# Patient Record
Sex: MALE | Race: WHITE | URBAN - METROPOLITAN AREA
[De-identification: names, ages, dates, MRNs, and addresses within clinical notes are randomized per-mention and may not be internally consistent; named-entity substitution may affect disease eponyms.]

---

## 2017-02-20 DIAGNOSIS — Z98.1 H/O SPINAL FUSION: ICD-10-CM

## 2017-02-20 DIAGNOSIS — M42.00 SCHEUERMANN'S KYPHOSIS: Primary | ICD-10-CM

## 2017-03-02 ENCOUNTER — OFFICE VISIT (OUTPATIENT)
Dept: ORTHOPEDICS | Facility: CLINIC | Age: 32
End: 2017-03-02

## 2017-03-02 VITALS — WEIGHT: 289.3 LBS | BODY MASS INDEX: 35.97 KG/M2 | HEIGHT: 75 IN

## 2017-03-02 DIAGNOSIS — Z98.1 H/O SPINAL FUSION: ICD-10-CM

## 2017-03-02 DIAGNOSIS — M42.00 SCHEUERMANN'S KYPHOSIS: Primary | ICD-10-CM

## 2017-03-02 RX ORDER — TIZANIDINE HYDROCHLORIDE 4 MG/1
1 CAPSULE, GELATIN COATED ORAL 2 TIMES DAILY
COMMUNITY
Start: 2016-11-15 | End: 2017-07-27

## 2017-03-02 RX ORDER — DEXTROAMPHETAMINE SACCHARATE, AMPHETAMINE ASPARTATE, DEXTROAMPHETAMINE SULFATE AND AMPHETAMINE SULFATE 2.5; 2.5; 2.5; 2.5 MG/1; MG/1; MG/1; MG/1
1 TABLET ORAL 3 TIMES DAILY
COMMUNITY
Start: 2016-11-15 | End: 2017-07-27

## 2017-03-02 RX ORDER — SERTRALINE HYDROCHLORIDE 100 MG/1
1 TABLET, FILM COATED ORAL DAILY
COMMUNITY
Start: 2015-04-15 | End: 2017-07-27

## 2017-03-02 RX ORDER — ALPRAZOLAM 1 MG
1 TABLET ORAL EVERY 8 HOURS
COMMUNITY
Start: 2016-10-15 | End: 2017-07-27

## 2017-03-02 RX ORDER — ALBUTEROL SULFATE 0.83 MG/ML
SOLUTION RESPIRATORY (INHALATION) PRN
COMMUNITY
Start: 2005-04-05

## 2017-03-02 RX ORDER — OXYCODONE HYDROCHLORIDE 15 MG/1
TABLET ORAL EVERY 6 HOURS PRN
COMMUNITY
Start: 2016-03-04 | End: 2017-07-27

## 2017-03-02 ASSESSMENT — ENCOUNTER SYMPTOMS
ARTHRALGIAS: 0
MUSCLE WEAKNESS: 0
BACK PAIN: 1
STIFFNESS: 0
NECK PAIN: 1
MYALGIAS: 1
MUSCLE CRAMPS: 0

## 2017-03-02 NOTE — MR AVS SNAPSHOT
"              After Visit Summary   3/2/2017    Octavio Eisenberg    MRN: [id number]           Patient Information     Date Of Birth          1985        Visit Information        Provider Department      3/2/2017 1:30 PM José Miguel Anne MD [facility]        Today's Diagnoses     Scheuermann's kyphosis    -  1    H/O spinal fusion           Follow-ups after your visit        Who to contact     Please call your clinic at [phone] to:    Ask questions about your health    Make or cancel appointments    Discuss your medicines    Learn about your test results    Speak to your doctor   If you have compliments or concerns about an experience at your clinic, or if you wish to file a complaint, please contact [facility] Patient Relations at [phone] or email us at [email or web address]         Additional Information About Your Visit        MyChart Information     eshtery is an electronic gateway that provides easy, online access to your medical records. With eshtery, you can request a clinic appointment, read your test results, renew a prescription or communicate with your care team.     To sign up for [employer] visit the website at [email or web address]   You will be asked to enter the access code listed below, as well as some personal information. Please follow the directions to create your username and password.     Your access code is: [email or web address]  Expires: 2017  7:31 AM     Your access code will  in 90 days. If you need help or a new code, please contact your [facility] or call [phone] for assistance.        Care EveryWhere ID     This is your Care EveryWhere ID. This could be used by other organizations to access your [place] medical records  [id number]        Your Vitals Were     Height BMI (Body Mass Index)                1.9 m (6' 2.8\") 36.35 kg/m2           Blood Pressure from Last 3 " Encounters:   03/14/15 105/55    Weight from Last 3 Encounters:   03/02/17 131.2 kg (289 lb 4.8 oz)   06/03/15 132.4 kg (291 lb 12.8 oz)   04/02/15 131.1 kg (289 lb)              Today, you had the following     No orders found for display         Today's Medication Changes          These changes are accurate as of: 3/2/17 11:59 PM.  If you have any questions, ask your nurse or doctor.               These medicines have changed or have updated prescriptions.        Dose/Directions    oxyCODONE 15 MG IR tablet   Commonly known as:  ROXICODONE   This may have changed:  Another medication with the same name was removed. Continue taking this medication, and follow the directions you see here.   Changed by:  José Miguel Anne MD        every 6 hours as needed   Refills:  0       sertraline 100 MG tablet   Commonly known as:  ZOLOFT   This may have changed:  Another medication with the same name was removed. Continue taking this medication, and follow the directions you see here.   Changed by:  José Miguel Anne MD        Dose:  1 tablet   1 tablet daily   Refills:  0         Stop taking these medicines if you haven't already. Please contact your care team if you have questions.     acetaminophen 500 MG tablet   Commonly known as:  TYLENOL   Stopped by:  José Miguel Anne MD           CEPHALEXIN PO   Stopped by:  José Miguel Anne MD           FLEXERIL PO   Stopped by:  José Miguel Anne MD           gabapentin 600 MG tablet   Commonly known as:  NEURONTIN   Stopped by:  José Miguel Anne MD           morphine 30 MG 12 hr tablet   Commonly known as:  MS CONTIN   Stopped by:  José Miguel Anne MD           pantoprazole 40 MG EC tablet   Commonly known as:  PROTONIX   Stopped by:  José Miguel Anne MD           polyethylene glycol Packet   Commonly known as:  MIRALAX/GLYCOLAX   Stopped by:  José Miguel Anne MD           VALIUM PO   Stopped by:  José Miguel Anne MD                    Primary Care  Provider    Physician No Ref-Primary       No address on file        Thank you!     Thank you for choosing [facility]  for your care. Our goal is always to provide you with excellent care. Hearing back from our patients is one way we can continue to improve our services. Please take a few minutes to complete the written survey that you may receive in the mail after your visit with us. Thank you!             Your Updated Medication List - Protect others around you: Learn how to safely use, store and throw away your medicines at www.disposemymeds.org.          This list is accurate as of: 3/2/17 11:59 PM.  Always use your most recent med list.                   Brand Name Dispense Instructions for use    ADDERALL 10 MG per tablet   Generic drug:  amphetamine-dextroamphetamine      1 tablet 3 times daily       ADVAIR DISKUS 250-50 MCG/DOSE diskus inhaler   Generic drug:  fluticasone-salmeterol      1 puff daily       albuterol (2.5 MG/3ML) 0.083% neb solution      as needed       ALPRAZolam 1 MG tablet    XANAX     1 tablet every 8 hours       oxyCODONE 15 MG IR tablet    ROXICODONE     every 6 hours as needed       POTASSIUM PO      Take 99 mEq by mouth daily       senna-docusate 8.6-50 MG per tablet    SENOKOT-S;PERICOLACE    30 tablet    Take 2 tablets by mouth 2 times daily       sertraline 100 MG tablet    ZOLOFT     1 tablet daily       TESTOSTERONE          tiZANidine 4 MG capsule    ZANAFLEX     1 capsule 2 times daily       ZANTAC PO      Take 150 mg by mouth 2 times daily

## 2017-03-02 NOTE — LETTER
3/2/2017       RE: Octavio Eisenberg  [address] [place] [place] [address]     Dear Colleague,    Thank you for referring your patient, Octavio Eisenberg, to the [facility] at [facility]. Please see a copy of my visit note below.    REASON FOR VISIT:  Follow revision posterior instrumented spinal fusion, due to broken instrumentation on 03/10/2015 by Dr. Anne.  The patient states that overall he is doing better than he was before.  He has been active recently in attempting to lose weight.  He states that he has lost 31 pounds since last year.  He continues on oxycodone 15 mg approximately every 6 hours or so.  His SYDNI score today is 46%, VAS back score is 7.5, PROMIS-10 global physical health score 11, mental health score 10, total score 21.      PHYSICAL EXAMINATION:  On exam, Octavio is a pleasant, large adult male with a height of 6 feet 3 inches, weight 289 pounds, calculated BMI of 36.35.  He stands and ambulates about the room freely.  He does have mild thoracic kyphosis persistent.  His pupils are constricted, and his generalized affect is quite narcotized.      IMAGING:  Repeat full standing AP and lateral spine radiographs were reviewed today.  His instrumentation is stable.  There is no evidence of hardware loosening or failure.  His pelvic incidence is 49 degrees, lumbar lordosis 35 degrees, and thoracic kyphosis approximately 49 degrees.  He has PILL mismatch of 14 degrees.      IMPRESSION:     1.  Scheuermann's kyphosis, status post revision posterior spinal fusion due to broken instrumentation.   2.  PILL mismatch with lumbar lordosis of 35 degrees and pelvic incidence of 49 degrees.      PLAN:  We had a nice discussion with Octavio today.  We congratulated him on his success with weight loss.  At this time, we discussed options moving forward.  We generally do not recommend the use of narcotics for chronic pain; however, the patient feels quite adamant  that he needs narcotics in order to continue to lose weight.  He feels that he needs for narcotics to be able to participate in exercise such as cycling and lifting.        With regard to his ongoing pain complaints and PILL mismatch, we did discuss the potential for surgical opportunities here including thoracolumbar combination Ruth-Su osteotomies and TLIF's versus a pedicle subtraction osteotomy at or near the thoracolumbar junction.  This could potentially correct his PILL mismatch and improve his overall posture and pain.  We discussed the risks, benefits and alternatives to this at length including the risk of blood loss, infection, damage to neurological structures, hardware failure, or failure to fuse.  The patient expressed good understanding.  The patient stated that he would like to get into better shape and lose more weight prior to considering surgery.  He will contact us when he is ready to discuss more.  We encouraged him to bring his mother with him on his next visit with repeat radiographs obtained prior to further discuss next steps.  He will ultimately decide on how he wishes to proceed.  We explained that there is no danger of continuing his current course at this time.  All questions and concerns were answered and addressed.      Total time spent with the patient was approximately 45 minutes with greater than 50% in counseling and coordination of care.      This patient was seen with Dr. Anne, who agrees with the above.       I saw and evaluated the patient on the day of the visit and I formulated the plan.  José Miguel Anne MD     cc: Octavio Eisenberg

## 2017-03-02 NOTE — PROGRESS NOTES
REASON FOR VISIT:  Follow revision posterior instrumented spinal fusion, due to broken instrumentation on 03/10/2015 by Dr. Anne.  The patient states that overall he is doing better than he was before.  He has been active recently in attempting to lose weight.  He states that he has lost 31 pounds since last year.  He continues on oxycodone 15 mg approximately every 6 hours or so.  His SYDNI score today is 46%, VAS back score is 7.5, PROMIS-10 global physical health score 11, mental health score 10, total score 21.      PHYSICAL EXAMINATION:  On exam, Octavio is a pleasant, large adult male with a height of 6 feet 3 inches, weight 289 pounds, calculated BMI of 36.35.  He stands and ambulates about the room freely.  He does have mild thoracic kyphosis persistent.  His pupils are constricted, and his generalized affect is quite narcotized.      IMAGING:  Repeat full standing AP and lateral spine radiographs were reviewed today.  His instrumentation is stable.  There is no evidence of hardware loosening or failure.  His pelvic incidence is 49 degrees, lumbar lordosis 35 degrees, and thoracic kyphosis approximately 49 degrees.  He has PILL mismatch of 14 degrees.      IMPRESSION:     1.  Scheuermann's kyphosis, status post revision posterior spinal fusion due to broken instrumentation.   2.  PILL mismatch with lumbar lordosis of 35 degrees and pelvic incidence of 49 degrees.      PLAN:  We had a nice discussion with Octavio today.  We congratulated him on his success with weight loss.  At this time, we discussed options moving forward.  We generally do not recommend the use of narcotics for chronic pain; however, the patient feels quite adamant that he needs narcotics in order to continue to lose weight.  He feels that he needs for narcotics to be able to participate in exercise such as cycling and lifting.        With regard to his ongoing pain complaints and PILL mismatch, we did discuss the potential for surgical  opportunities here including thoracolumbar combination Ruth-Su osteotomies and TLIF's versus a pedicle subtraction osteotomy at or near the thoracolumbar junction.  This could potentially correct his PILL mismatch and improve his overall posture and pain.  We discussed the risks, benefits and alternatives to this at length including the risk of blood loss, infection, damage to neurological structures, hardware failure, or failure to fuse.  The patient expressed good understanding.  The patient stated that he would like to get into better shape and lose more weight prior to considering surgery.  He will contact us when he is ready to discuss more.  We encouraged him to bring his mother with him on his next visit with repeat radiographs obtained prior to further discuss next steps.  He will ultimately decide on how he wishes to proceed.  We explained that there is no danger of continuing his current course at this time.  All questions and concerns were answered and addressed.      Total time spent with the patient was approximately 45 minutes with greater than 50% in counseling and coordination of care.      This patient was seen with Dr. Anne, who agrees with the above.       I saw and evaluated the patient on the day of the visit and I formulated the plan.  José Miguel Anne MD     cc: Octavio Eisenberg         Answers for HPI/ROS submitted by the patient on 3/2/2017   General Symptoms: No  Skin Symptoms: No  HENT Symptoms: No  EYE SYMPTOMS: No  HEART SYMPTOMS: No  LUNG SYMPTOMS: No  INTESTINAL SYMPTOMS: No  URINARY SYMPTOMS: No  REPRODUCTIVE SYMPTOMS: No  SKELETAL SYMPTOMS: Yes  BLOOD SYMPTOMS: No  NERVOUS SYSTEM SYMPTOMS: No  MENTAL HEALTH SYMPTOMS: No  Back pain: Yes  Muscle aches: Yes  Neck pain: Yes  Joint pain: No  Bone pain: Yes  Muscle cramps: No  Muscle weakness: No  Joint stiffness: No  Bone fracture: No

## 2017-03-02 NOTE — LETTER
3/2/2017       RE: Octavio Eisenberg  [address] [facility] [place] [address]     Dear Colleague,    Thank you for referring your patient, Octavio Eisenberg, to the [facility] at [facility]. Please see a copy of my visit note below.    REASON FOR VISIT:  Follow revision posterior instrumented spinal fusion, due to broken instrumentation on 03/10/2015 by Dr. Anne.  The patient states that overall he is doing better than he was before.  He has been active recently in attempting to lose weight.  He states that he has lost 31 pounds since last year.  He continues on oxycodone 15 mg approximately every 6 hours or so.  His SYDNI score today is 46%, VAS back score is 7.5, PROMIS-10 global physical health score 11, mental health score 10, total score 21.      PHYSICAL EXAMINATION:  On exam, Octavio is a pleasant, large adult male with a height of 6 feet 3 inches, weight 289 pounds, calculated BMI of 36.35.  He stands and ambulates about the room freely.  He does have mild thoracic kyphosis persistent.  His pupils are constricted, and his generalized affect is quite narcotized.      IMAGING:  Repeat full standing AP and lateral spine radiographs were reviewed today.  His instrumentation is stable.  There is no evidence of hardware loosening or failure.  His pelvic incidence is 49 degrees, lumbar lordosis 35 degrees, and thoracic kyphosis approximately 49 degrees.  He has PILL mismatch of 14 degrees.      IMPRESSION:     1.  Scheuermann's kyphosis, status post revision posterior spinal fusion due to broken instrumentation.   2.  PILL mismatch with lumbar lordosis of 35 degrees and pelvic incidence of 49 degrees.      PLAN:  We had a nice discussion with Octavio today.  We congratulated him on his success with weight loss.  At this time, we discussed options moving forward.  We generally do not recommend the use of narcotics for chronic pain; however, the patient feels quite adamant  that he needs narcotics in order to continue to lose weight.  He feels that he needs for narcotics to be able to participate in exercise such as cycling and lifting.        With regard to his ongoing pain complaints and PILL mismatch, we did discuss the potential for surgical opportunities here including thoracolumbar combination Ruth-Su osteotomies and TLIF's versus a pedicle subtraction osteotomy at or near the thoracolumbar junction.  This could potentially correct his PILL mismatch and improve his overall posture and pain.  We discussed the risks, benefits and alternatives to this at length including the risk of blood loss, infection, damage to neurological structures, hardware failure, or failure to fuse.  The patient expressed good understanding.  The patient stated that he would like to get into better shape and lose more weight prior to considering surgery.  He will contact us when he is ready to discuss more.  We encouraged him to bring his mother with him on his next visit with repeat radiographs obtained prior to further discuss next steps.  He will ultimately decide on how he wishes to proceed.  We explained that there is no danger of continuing his current course at this time.  All questions and concerns were answered and addressed.      Total time spent with the patient was approximately 45 minutes with greater than 50% in counseling and coordination of care.      This patient was seen with Dr. Anne, who agrees with the above.       I saw and evaluated the patient on the day of the visit and I formulated the plan.  José Miguel Anne MD     cc: Octavio Eisenberg

## 2017-07-24 DIAGNOSIS — M42.00 SCHEUERMANN'S KYPHOSIS: Primary | ICD-10-CM

## 2017-07-24 DIAGNOSIS — Z98.1 H/O SPINAL FUSION: ICD-10-CM

## 2017-07-27 ENCOUNTER — OFFICE VISIT (OUTPATIENT)
Dept: ORTHOPEDICS | Facility: CLINIC | Age: 32
End: 2017-07-27

## 2017-07-27 VITALS — BODY MASS INDEX: 31.04 KG/M2 | HEIGHT: 75 IN | WEIGHT: 249.6 LBS

## 2017-07-27 DIAGNOSIS — M42.00 SCHEUERMANN'S KYPHOSIS: Primary | ICD-10-CM

## 2017-07-27 DIAGNOSIS — Z98.1 H/O SPINAL FUSION: ICD-10-CM

## 2017-07-27 RX ORDER — HYDROCODONE BITARTRATE AND ACETAMINOPHEN 10; 325 MG/1; MG/1
1-2 TABLET ORAL EVERY 6 HOURS PRN
COMMUNITY

## 2017-07-27 ASSESSMENT — ENCOUNTER SYMPTOMS
NERVOUS/ANXIOUS: 1
PANIC: 0
STIFFNESS: 0
ARTHRALGIAS: 0
MYALGIAS: 1
MUSCLE CRAMPS: 0
DEPRESSION: 1
BACK PAIN: 1
JOINT SWELLING: 0
NECK PAIN: 1
MUSCLE WEAKNESS: 0
INSOMNIA: 1
DECREASED CONCENTRATION: 0

## 2017-07-27 NOTE — LETTER
7/27/2017       RE: Octavio Eisenberg  [address] [facility] [address]     Dear Colleague,    Thank you for referring your patient, Octavio Eisenberg, to the [facility] at [facility]. Please see a copy of my visit note below.    REASON FOR VISIT: RECHECK    REFERRING PHYSICIAN: Established Patient     PRIMARY CARE PHYSICIAN: No Ref-Primary, Physician    HISTORY OF PRESENT ILLNESS: Octavio Eisenberg is a 31 year old male who returns to clinic today for assessment of increased back pain that occurred after golfing five weeks ago.  He is status post revision posterior spinal fusion, which was performed by Dr. Anne on 3/10/15.  Five weeks ago after golfing, he noted a sudden and intense increase in his mid-thoracic back pain.  This kept him in bed for about five days, and he had to use vicodin to treat the pain.  He was very concerned about his hardware and possible fracture.  However, since the injury, his pain has improved greatly.  He was last seen on 3/2/17 when he discussed surgical options with Dr. Anne.  He states that he would not consider surgery at this time because he is feeling good, but he brought his mother with him today to discuss and understand the proposed procedure.  He notes that he has not taken Vicodin or any other narcotics for the past two weeks.     Oswestry score: 30% (previously 46%)  Ohfjub20: 20 (previously 21)  Pain scale: 4 (previously 7)    PHYSICAL EXAM:   Constitutional - Patient is healthy, well-nourished and appears stated age.    BMI = 31.36    Respiratory - Patient is breathing normally and in no respiratory distress.   Skin - No suspicious rashes or lesions.   Psychiatric - Normal mood and affect.   Eyes - Visual acuity is normal to the written word.   ENT - Hearing intact to the spoken word.   GI - No abdominal distention.   Musculoskeletal - Non-antalgic gait without use of assistive devices.       IMAGING: Radiographs of  the full spine were obtained today.  They show instrumentation that is intact without evidence of fracture, loosening or migration.  PI=46 degrees.  LL=35 degrees. See full radiologic report in chart.    CLINICAL ASSESSMENT:   1. Scheuermann's kyphosis, status post revision posterior spinal fusion due to broken instrumentation  2. Slight PI-LL mismatch    DISCUSSION/PLAN:   Octavio is a 31 year old male who returns to clinic today for assessment of his posterior spinal fusion instrumentation.  He injured his back five weeks ago while playing golf and experienced intense pain that made him concerned about his hardware.  Radiographs were obtained today, and they showed that the instrumentation is intact without evidence of loosening or failure.  He felt very reassured by this.  We briefly discussed surgery with Octavio and his mother that could increase his lumbar lordosis in future.  However, this is not something that needs to be performed unless he cannot live with his symptoms.  The best thing Octavio can do at this time is to continue low impact aerobic exercise to maintain disc health.  He can follow-up as needed.    All questions and concerns were answered to the patient's apparent satisfaction before leaving the clinic.     Thank you for allowing Dr. Anne and I to participate in the care of Octavio Eisenberg.    Respectfully,  Gayathri Simpson PA-C    I saw and evaluated the patient on the day of the visit and I formulated the plan.  José Miguel Anne MD    CC  Copy to patient  [address] [facility] [address]

## 2017-07-27 NOTE — PROGRESS NOTES
REASON FOR VISIT: RECHECK    REFERRING PHYSICIAN: Established Patient     PRIMARY CARE PHYSICIAN: No Ref-Primary, Physician    HISTORY OF PRESENT ILLNESS: Octavio Eisenberg is a 31 year old male who returns to clinic today for assessment of increased back pain that occurred after golfing five weeks ago.  He is status post revision posterior spinal fusion, which was performed by Dr. Anne on 3/10/15.  Five weeks ago after golfing, he noted a sudden and intense increase in his mid-thoracic back pain.  This kept him in bed for about five days, and he had to use vicodin to treat the pain.  He was very concerned about his hardware and possible fracture.  However, since the injury, his pain has improved greatly.  He was last seen on 3/2/17 when he discussed surgical options with Dr. Anne.  He states that he would not consider surgery at this time because he is feeling good, but he brought his mother with him today to discuss and understand the proposed procedure.  He notes that he has not taken Vicodin or any other narcotics for the past two weeks.     Oswestry score: 30% (previously 46%)  Khwzdo07: 20 (previously 21)  Pain scale: 4 (previously 7)    PHYSICAL EXAM:   Constitutional - Patient is healthy, well-nourished and appears stated age.    BMI = 31.36    Respiratory - Patient is breathing normally and in no respiratory distress.   Skin - No suspicious rashes or lesions.   Psychiatric - Normal mood and affect.   Eyes - Visual acuity is normal to the written word.   ENT - Hearing intact to the spoken word.   GI - No abdominal distention.   Musculoskeletal - Non-antalgic gait without use of assistive devices.       IMAGING: Radiographs of the full spine were obtained today.  They show instrumentation that is intact without evidence of fracture, loosening or migration.  PI=46 degrees.  LL=35 degrees. See full radiologic report in chart.    CLINICAL ASSESSMENT:   1. Scheuermann's kyphosis, status post revision posterior  spinal fusion due to broken instrumentation  2. Slight PI-LL mismatch    DISCUSSION/PLAN:   Octavio is a 31 year old male who returns to clinic today for assessment of his posterior spinal fusion instrumentation.  He injured his back five weeks ago while playing golf and experienced intense pain that made him concerned about his hardware.  Radiographs were obtained today, and they showed that the instrumentation is intact without evidence of loosening or failure.  He felt very reassured by this.  We briefly discussed surgery with Octavio and his mother that could increase his lumbar lordosis in future.  However, this is not something that needs to be performed unless he cannot live with his symptoms.  The best thing Octavio can do at this time is to continue low impact aerobic exercise to maintain disc health.  He can follow-up as needed.    All questions and concerns were answered to the patient's apparent satisfaction before leaving the clinic.     Thank you for allowing Dr. Anne and I to participate in the care of Octavio Eisenberg.    Respectfully,  Gayathri Simpson PA-C    I saw and evaluated the patient on the day of the visit and I formulated the plan.  José Miguel Anne MD      CC  Copy to patient    [address] [facility] [id number]    Answers for HPI/ROS submitted by the patient on 7/27/2017   General Symptoms: No  Skin Symptoms: No  HENT Symptoms: No  EYE SYMPTOMS: No  HEART SYMPTOMS: No  LUNG SYMPTOMS: No  INTESTINAL SYMPTOMS: No  URINARY SYMPTOMS: No  REPRODUCTIVE SYMPTOMS: No  SKELETAL SYMPTOMS: Yes  BLOOD SYMPTOMS: No  NERVOUS SYSTEM SYMPTOMS: No  MENTAL HEALTH SYMPTOMS: Yes  Back pain: Yes  Muscle aches: Yes  Neck pain: Yes  Swollen joints: No  Joint pain: No  Bone pain: Yes  Muscle cramps: No  Muscle weakness: No  Joint stiffness: No  Bone fracture: No  Nervous or Anxious: Yes  Depression: Yes  Trouble sleeping: Yes  Trouble thinking or concentrating: No  Mood changes: Yes  Panic attacks: No

## 2017-07-27 NOTE — NURSING NOTE
"Reason For Visit:   Chief Complaint   Patient presents with     RECHECK     s/p PSF 3/10/15              Pt. states his back pain increased about 5 weeks ago after golfing, this is steadily improving.    Primary MD: none  Ref. 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  Currently working? Yes.  Work status?  Full time.  Date of injury: none    Date of surgery: 3/10/15  Type of surgery: PROCEDURES:   1.  Revision of posterior spinal fusion.   2.  Reinsertion segmental spinal instrumentation T12 through L3.   3.  Image-guided surgery for placement L2 pedicle screws.   4.  T12-L1 Smith-Su osteotomy.   5.  Injection of intrathecal substance. .  Smoker: No      Ht 1.9 m (6' 2.8\")  Wt 113.2 kg (249 lb 9.6 oz)  BMI 31.36 kg/m2    Pain Assessment  Patient Currently in Pain: Yes  0-10 Pain Scale: 4  Primary Pain Location: Back  Pain Orientation: Mid  Pain Descriptors: Aching  Alleviating Factors: Other (comment) (nothing)  Aggravating Factors: Other (comment) (overuse / golfing about 5 weeks ago)    Oswestry (SYDNI) Questionnaire    OSWESTRY DISABILITY INDEX 7/27/2017   Section 1 - Pain intensity 2   Section 2 - Personal care (washing, dressing, etc.)  1   Section 3 - Lifting 2   Section 4 - Walking 1   Section 5 - Sitting 1   Section 6 - Standing 2   Section 7 - Sleeping 1   Section 8 - Sex life (if applicable) 1   Section 9 - Social life 3   Section 10 - Traveling 1   Count 10   Sum 15   Oswestry Score (%) 30   SECTION 1-PAIN INTENSITY The pain is moderate at the moment.   SECTION 2-PERSONAL CARE (WASHING,DRESSING,ETC.) I can look after myself normally but it is very painful.   SECTION 3-LIFTING Pain prevents me from lifting heavy weights off the floor but I can manage if they are conveniently positioned, e.g. on a table.   SECTION 4-WALKING Pain prevents me from walking more than one mile.   SECTION 5-SITTING I can sit in my favorite chair as long as I like.   SECTION 6-STANDING Pain prevents me from " standing for more than 1 hour.   SECTION 7-SLEEPING My sleep is occasionally interrupted by pain.   SECTION 8-SEX LIFE (IF APPLICABLE) My sex life is normal but causes some additional pain.   SECTION 9-SOCIAL LIFE Pain has restricted my social life and I do not go out as often.   SECTION 10-TRAVELING I can travel anywhere but it gives me additional pain.   Oswestry Disability Index: Count 10   SYDNI: Total Score = SUM (total for answered questions) 15   Computed Oswestry Score 30 (%)   Some recent data might be hidden                  Numeric Rating Scale:  VAS Scores     VAS Survey 7/27/2017   What is your level of back pain during the last week: 5.5   What is your level of RIGHT leg pain during the last week: 0   What is your level of LEFT leg pain during the last week: 0   What is your level of neck pain during the last week: 0   What is your level of RIGHT arm pain during the last week: 0   What is your level of LEFT arm pain during the last week: 0                Promis 10 Assessment    PROMIS 10 7/27/2017   In general, would you say your health is: Fair   In general, would you say your quality of life is: Good   In general, how would you rate your physical health? Fair   In general, how would you rate your mental health, including your mood and your ability to think? Good   In general, how would you rate your satisfaction with your social activities and relationships? Fair   In general, please rate how well you carry out your usual social activities and roles Fair   To what extent are you able to carry out your everyday physical activities such as walking, climbing stairs, carrying groceries, or moving a chair? Moderately   How often have you been bothered by emotional problems such as feeling anxious, depressed or irritable? Often   How would you rate your fatigue on average? Severe   How would you rate your pain on average?   0 = No Pain  to  10 = Worst Imaginable Pain 5   Global Physical Health Score : Raw  Score 10 (SUM : G03 - G06 - G07 - G08)   Global Mentall Health Score : Raw Score 10 (SUM : G02 - G04 - G05 - G10)   Total (Physical + Mental Health Score) 20   In general, would you say your health is: 2   In general, would you say your quality of life is: 3   In general, how would you rate your physical health? 2   In general, how would you rate your mental health, including your mood and your ability to think? 3   In general, how would you rate your satisfaction with your social activities and relationships? 2   In general, please rate how well you carry out your usual social activities and roles. (This includes activities at home, at work and in your community, and responsibilities as a parent, child, spouse, employee, friend, etc.) 2   To what extent are you able to carry out your everyday physical activities such as walking, climbing stairs, carrying groceries, or moving a chair? 3   In the past 7 days, how often have you been bothered by emotional problems such as feeling anxious, depressed, or irritable? 2   In the past 7 days, how would you rate your fatigue on average? 2   In the past 7 days, how would you rate your pain on average, where 0 means no pain, and 10 means worst imaginable pain? 5   Global Mental Health Score 10   Global Physical Health Score 10   PROMIS TOTAL - SUBSCORES 20   Pain question re-calculation - no clinical value 3   Some recent data might be hidden

## 2017-07-27 NOTE — MR AVS SNAPSHOT
After Visit Summary   2017    Octavio Eisenberg    MRN: [id number]           Patient Information     Date Of Birth          1985        Visit Information        Provider Department      2017 11:30 AM José Miguel Anne MD [facility]        Today's Diagnoses     Scheuermann's kyphosis    -  1    H/O spinal fusion           Follow-ups after your visit        Follow-up notes from your care team     Return in about 1 year (around 2018).      Who to contact     Please call your clinic at [phone] to:    Ask questions about your health    Make or cancel appointments    Discuss your medicines    Learn about your test results    Speak to your doctor   If you have compliments or concerns about an experience at your clinic, or if you wish to file a complaint, please contact [facility] Patient Relations at [phone] or email us at [email or web address]         Additional Information About Your Visit        MyChart Information     [employer] is an electronic gateway that provides easy, online access to your medical records. With [employer], you can request a clinic appointment, read your test results, renew a prescription or communicate with your care team.     To sign up for [employer] visit the website at [email or web address]   You will be asked to enter the access code listed below, as well as some personal information. Please follow the directions to create your username and password.     Your access code is: [email or web address]  Expires: 10/11/2017  6:31 AM     Your access code will  in 90 days. If you need help or a new code, please contact your [facility] or call [phone] for assistance.        Care EveryWhere ID     This is your Care EveryWhere ID. This could be used by other organizations to access your [place] medical records  [id number]        Your Vitals Were     Height BMI (Body Mass  "Index)                1.9 m (6' 2.8\") 31.36 kg/m2           Blood Pressure from Last 3 Encounters:   03/14/15 105/55    Weight from Last 3 Encounters:   07/27/17 113.2 kg (249 lb 9.6 oz)   03/02/17 131.2 kg (289 lb 4.8 oz)   06/03/15 132.4 kg (291 lb 12.8 oz)              Today, you had the following     No orders found for display         Today's Medication Changes          These changes are accurate as of: 7/27/17  4:44 PM.  If you have any questions, ask your nurse or doctor.               Stop taking these medicines if you haven't already. Please contact your care team if you have questions.     ADDERALL 10 MG per tablet   Generic drug:  amphetamine-dextroamphetamine   Stopped by:  José Miguel Anne MD           ALPRAZolam 1 MG tablet   Commonly known as:  XANAX   Stopped by:  José Miguel Anne MD           oxyCODONE 15 MG IR tablet   Commonly known as:  ROXICODONE   Stopped by:  José Miguel Anne MD           POTASSIUM PO   Stopped by:  José Miguel Anne MD           senna-docusate 8.6-50 MG per tablet   Commonly known as:  SENOKOT-S;PERICOLACE   Stopped by:  José Miguel Anne MD           sertraline 100 MG tablet   Commonly known as:  ZOLOFT   Stopped by:  José Miguel Anne MD           tiZANidine 4 MG capsule   Commonly known as:  ZANAFLEX   Stopped by:  José Miguel Anne MD           ZANTAC PO   Stopped by:  José Miguel Anne MD                    Primary Care Provider    Physician No Ref-Primary       No address on file        Equal Access to Services     [facility]: Hadii aad ku hadasho Soomaali, waaxda luqadaha, qaybta kaalmada adeegyada, shelia idiin hayaan adeeg kharash la'aan . So [facility] [phone].    ATENCIÓN: Si habla español, tiene a godinez disposición servicios gratuitos de asistencia lingüística. Llame al [phone].    We comply with applicable federal civil rights laws and [place] laws. We do not discriminate on the basis of race, color, national origin, age, disability sex, " sexual orientation or gender identity.            Thank you!     Thank you for choosing [facility]  for your care. Our goal is always to provide you with excellent care. Hearing back from our patients is one way we can continue to improve our services. Please take a few minutes to complete the written survey that you may receive in the mail after your visit with us. Thank you!             Your Updated Medication List - Protect others around you: Learn how to safely use, store and throw away your medicines at www.disposemymeds.org.          This list is accurate as of: 7/27/17  4:44 PM.  Always use your most recent med list.                   Brand Name Dispense Instructions for use Diagnosis    ADVAIR DISKUS 250-50 MCG/DOSE diskus inhaler   Generic drug:  fluticasone-salmeterol      1 puff daily        albuterol (2.5 MG/3ML) 0.083% neb solution      as needed        HYDROcodone-acetaminophen  MG per tablet    NORCO     Take 1-2 tablets by mouth every 6 hours as needed for moderate to severe pain        TESTOSTERONE

## 2017-08-01 ENCOUNTER — TELEPHONE (OUTPATIENT)
Dept: ORTHOPEDICS | Facility: CLINIC | Age: 32
End: 2017-08-01

## 2017-08-01 NOTE — TELEPHONE ENCOUNTER
[facility]:  Nursing Note  SITUATION                                                      Octavio Eisenberg is a 31 year old male who had an incidental finding on his recent entire spine imaging.    BACKGROUND                                                      Patient is is being treated for kyphosis with h/o spinal fusion.    Date of last clinic appointment: on 7/27/2017 with Dr. Anne    Does patient have a future appointment scheduled?  No    Internal imaging reviewed in EPIC    ASSESSMENT      Patient has incidental finding on his spine imaging from 7/27/2017.  Radiologist has flagged an L3 screw lucency.    PLAN                                                      Nursing Interventions: Encounter routed to ANDREA Naranjo for futher follow-up.  RECOMMENDED DISPOSITION: incidental finding reported to Dr. Anne's RNCC  Will comply with recommendation: N/A    Patient/family can be reached at: N/A    If further questions/concerns or if symptoms do not improve, worsen or new symptoms develop, patient/family are advised to call [phone], option #3 to speak with a triage nurse, as soon as possible.    Taylor Villarreal

## 2017-08-02 NOTE — TELEPHONE ENCOUNTER
reviewed incidental finding about L3 screw  in XR report from 7-27-17 & stated  He thinks it is stable & he is not worried about it.    Return prn.  V.O.R.B./Nga Murillo RN.

## 2019-07-31 ENCOUNTER — TELEPHONE (OUTPATIENT)
Dept: ORTHOPEDICS | Facility: CLINIC | Age: 34
End: 2019-07-31

## 2019-07-31 NOTE — TELEPHONE ENCOUNTER
[facility] Call Center    Phone Message    May a detailed message be left on voicemail: yes    Reason for Call: Other: Pt requesting call back from Nga. Pt would like to discuss the possibility of Dr. Daugherty seeing him for an appt and new imaging on either 9/4 or 9/5 because he will be in town those days, pt currently lives in [place]. Pt will be up here in the fall due to his sister having a baby, and would like to discuss having surgery if possible with Dr. daugherty during that time as well while his whole family is here and would be able to take care of him     Action Taken: Message routed to:  [facility]o.    See phone message.  I called pt back & made appt. & asked him to bring his mom to appt & he agreed. Nga Murillo RN.

## 2019-08-30 DIAGNOSIS — M42.00 SCHEURMANN'S DISEASE: Primary | ICD-10-CM
